# Patient Record
Sex: MALE | Race: WHITE | Employment: UNEMPLOYED | URBAN - METROPOLITAN AREA
[De-identification: names, ages, dates, MRNs, and addresses within clinical notes are randomized per-mention and may not be internally consistent; named-entity substitution may affect disease eponyms.]

---

## 2020-09-06 ENCOUNTER — OFFICE VISIT (OUTPATIENT)
Dept: PRIMARY CARE CLINIC | Age: 20
End: 2020-09-06
Payer: COMMERCIAL

## 2020-09-06 ENCOUNTER — HOSPITAL ENCOUNTER (OUTPATIENT)
Age: 20
Setting detail: SPECIMEN
Discharge: HOME | End: 2020-09-06
Payer: COMMERCIAL

## 2020-09-06 VITALS
BODY MASS INDEX: 18.77 KG/M2 | WEIGHT: 151 LBS | OXYGEN SATURATION: 98 % | HEIGHT: 75 IN | HEART RATE: 93 BPM | TEMPERATURE: 99.8 F

## 2020-09-06 PROCEDURE — 99213 OFFICE O/P EST LOW 20 MIN: CPT | Performed by: NURSE PRACTITIONER

## 2020-09-06 PROCEDURE — 87880 STREP A ASSAY W/OPTIC: CPT | Performed by: NURSE PRACTITIONER

## 2020-09-06 RX ORDER — AMOXICILLIN 875 MG/1
875 TABLET, COATED ORAL 2 TIMES DAILY
Qty: 20 TABLET | Refills: 0 | Status: SHIPPED | OUTPATIENT
Start: 2020-09-06 | End: 2020-09-16

## 2020-09-06 ASSESSMENT — ENCOUNTER SYMPTOMS
SHORTNESS OF BREATH: 0
COUGH: 0
SORE THROAT: 1
VOMITING: 0

## 2020-09-06 NOTE — PATIENT INSTRUCTIONS
Increase fluids  Good handwashing  Amoxicillin as directed  Strep was positive here  covid 19 testing is pending  Recommend quarantine  Motrin and tylenol as needed as directed if able to take  Gargle warm salt water  New toothbrush if strep (24 hours after starting antibiotic)  Recheck for worsening, change or concern  Follow up with primary care in one week, sooner if worsens    You were tested for COVID today. We will call you with results when they are available. If you have not heard from us in 7 days, please call our office.     -You have had no fever for at least 24 hours (that is one full days of no fever without the use medicine that reduces fevers)  -AND other symptoms have improved (for example, when your cough or shortness of breath have improved)  -AND at least 10 days have passed since your symptoms first appeared  Patient Education        Strep Throat: Care Instructions  Your Care Instructions     Strep throat is a bacterial infection that causes sudden, severe sore throat and fever. Strep throat, which is caused by bacteria called streptococcus, is treated with antibiotics. Sometimes a strep test is necessary to tell if the sore throat is caused by strep bacteria. Treatment can help ease symptoms and may prevent future problems. Follow-up care is a key part of your treatment and safety. Be sure to make and go to all appointments, and call your doctor if you are having problems. It's also a good idea to know your test results and keep a list of the medicines you take. How can you care for yourself at home? · Take your antibiotics as directed. Do not stop taking them just because you feel better. You need to take the full course of antibiotics. · Strep throat can spread to others until 24 hours after you begin taking antibiotics. During this time, you should avoid contact with other people at work or home, especially infants and children. Do not sneeze or cough on others, and wash your hands often. Keep your drinking glass and eating utensils separate from those of others, and wash these items well in hot, soapy water. · Gargle with warm salt water at least once each hour to help reduce swelling and make your throat feel better. Use 1 teaspoon of salt mixed in 8 fluid ounces of warm water. · Take an over-the-counter pain medication, such as acetaminophen (Tylenol), ibuprofen (Advil, Motrin), or naproxen (Aleve). Read and follow all instructions on the label. · Try an over-the-counter anesthetic throat spray or throat lozenges, which may help relieve throat pain. · Drink plenty of fluids. Fluids may help soothe an irritated throat. Hot fluids, such as tea or soup, may help your throat feel better. · Eat soft solids and drink plenty of clear liquids. Flavored ice pops, ice cream, scrambled eggs, sherbet, and gelatin dessert (such as Jell-O) may also soothe the throat. · Get lots of rest.  · Do not smoke, and avoid secondhand smoke. If you need help quitting, talk to your doctor about stop-smoking programs and medicines. These can increase your chances of quitting for good. · Use a vaporizer or humidifier to add moisture to the air in your bedroom. Follow the directions for cleaning the machine. When should you call for help? Call your doctor now or seek immediate medical care if:  · You have a new or higher fever. · You have a fever with a stiff neck or severe headache. · You have new or worse trouble swallowing. · Your sore throat gets much worse on one side. · Your pain becomes much worse on one side of your throat. Watch closely for changes in your health, and be sure to contact your doctor if:  · You are not getting better after 2 days (48 hours). · You do not get better as expected. Where can you learn more? Go to [email or web address] org and sign in to your [employer] account.  Enter K625 in the [facility] box to learn more about \"Strep Throat: Care Instructions. \"     If you do not have an account, please click on the \"Sign Up Now\" link. Current as of: July 29, 2019               Content Version: 12.5  © 6727-4084 Healthwise, Incorporated. Care instructions adapted under license by [facility] ([facility]). If you have questions about a medical condition or this instruction, always ask your healthcare professional. [address] any warranty or liability for your use of this information.

## 2020-09-06 NOTE — PROGRESS NOTES
[address]  [address] [address]  Dept: [phone]  Dept Fax: [phone]    Leanne Sylvester a 23 y.o. male who presents to the urgent care today for his medical conditions/complaintsas noted below. Jeffrey Wheat is c/o of Pharyngitis (Headache, body aches x two days  - sibling had strep throat )      HPI:     Cc- headache, body aches, sore  Throat  for 2 days  Sibling had strep      Pharyngitis   This is a new problem. The current episode started yesterday. The problem occurs intermittently. The problem has been gradually worsening. Associated symptoms include a fever, headaches, myalgias and a sore throat. Pertinent negatives include no chest pain, congestion, coughing or vomiting. The symptoms are aggravated by swallowing. He has tried sleep and drinking for the symptoms. No past medical history on file. Current Outpatient Medications   Medication Sig Dispense Refill    amoxicillin (AMOXIL) 875 MG tablet Take 1 tablet by mouth 2 times daily for 10 days 20 tablet 0     No current facility-administered medications for this visit. No Known Allergies    Subjective:     Review of Systems   Constitutional: Positive for fever. HENT: Positive for sore throat. Negative for congestion. Respiratory: Negative for cough and shortness of breath. Cardiovascular: Negative for chest pain. Gastrointestinal: Negative for vomiting. Musculoskeletal: Positive for myalgias. Neurological: Positive for headaches. All other systems reviewed and are negative. Objective:      Physical Exam  Vitals signs and nursing note reviewed. Constitutional:       General: He is not in acute distress. Appearance: Normal appearance. He is well-developed. He is not ill-appearing, toxic-appearing or diaphoretic. HENT:      Head: Normocephalic and atraumatic. Nose: Nose normal. No rhinorrhea.       Mouth/Throat:      Mouth: Mucous membranes are moist.      Pharynx: Posterior oropharyngeal erythema present. Eyes:      General: No scleral icterus. Right eye: No discharge. Left eye: No discharge. Neck:      Musculoskeletal: Normal range of motion and neck supple. No neck rigidity. Cardiovascular:      Rate and Rhythm: Normal rate and regular rhythm. Heart sounds: Normal heart sounds. No murmur. Pulmonary:      Effort: Pulmonary effort is normal. No respiratory distress. Breath sounds: Normal breath sounds. No stridor. No wheezing, rhonchi or rales. Abdominal:      General: Bowel sounds are normal.      Palpations: Abdomen is soft. Musculoskeletal: Normal range of motion. General: No signs of injury. Lymphadenopathy:      Cervical: No cervical adenopathy. Skin:     General: Skin is warm and dry. Coloration: Skin is not jaundiced or pale. Findings: No erythema or rash. Neurological:      General: No focal deficit present. Mental Status: He is alert and oriented to person, place, and time. Psychiatric:         Mood and Affect: Mood normal.         Behavior: Behavior normal.       Pulse 93   Temp 99.8 °F (37.7 °C) (Temporal)   Ht 6' 2.5\" (1.892 m)   Wt 151 lb (68.5 kg)   SpO2 98%   BMI 19.13 kg/m²   poct rapid strep is positive here    Assessment:          Diagnosis Orders   1. Acute streptococcal pharyngitis  amoxicillin (AMOXIL) 875 MG tablet   2. Sore throat  POCT rapid strep A    Covid-19 Ambulatory       Plan:    Increase fluids  Good handwashing  Amoxicillin as directed  Strep was positive here  covid 19 testing is pending  Recommend quarantine  Motrin and tylenol as needed as directed if able to take  Gargle warm salt water  New toothbrush if strep (24 hours after starting antibiotic)  Recheck for worsening, change or concern  Follow up with primary care in one week, sooner if worsens    You were tested for COVID today.  We will call you with results when they are available. If you have not heard from us in 7 days, please call our office.     -You have had no fever for at least 24 hours (that is one full days of no fever without the use medicine that reduces fevers)  -AND other symptoms have improved (for example, when your cough or shortness of breath have improved)  -AND at least 10 days have passed since your symptoms first appeared  Return for follow up with primary care in 7 days, worsening, change or concern. Orders Placed This Encounter   Medications    amoxicillin (AMOXIL) 875 MG tablet     Sig: Take 1 tablet by mouth 2 times daily for 10 days     Dispense:  20 tablet     Refill:  0         Patient given educational materials - see patientinstructions. Discussed use, benefit, and side effects of prescribed medications. All patient questions answered. Pt voiced understanding.     Electronically signed by FERNANDA Blas 9/6/2020 at 3:37 PM

## 2020-09-10 LAB — SARS-COV-2, NAA: NOT DETECTED

## 2022-08-01 ENCOUNTER — HOSPITAL ENCOUNTER (OUTPATIENT)
Age: 22
Setting detail: SPECIMEN
Discharge: HOME | End: 2022-08-01

## 2022-08-01 ENCOUNTER — OFFICE VISIT (OUTPATIENT)
Dept: FAMILY MEDICINE CLINIC | Age: 22
End: 2022-08-01
Payer: COMMERCIAL

## 2022-08-01 VITALS
WEIGHT: 150 LBS | TEMPERATURE: 98.1 F | HEART RATE: 94 BPM | OXYGEN SATURATION: 96 % | HEIGHT: 75 IN | SYSTOLIC BLOOD PRESSURE: 104 MMHG | DIASTOLIC BLOOD PRESSURE: 65 MMHG | BODY MASS INDEX: 18.65 KG/M2

## 2022-08-01 DIAGNOSIS — Z20.822 SUSPECTED COVID-19 VIRUS INFECTION: Primary | ICD-10-CM

## 2022-08-01 PROCEDURE — 99213 OFFICE O/P EST LOW 20 MIN: CPT | Performed by: FAMILY MEDICINE

## 2022-08-01 SDOH — ECONOMIC STABILITY: FOOD INSECURITY: WITHIN THE PAST 12 MONTHS, YOU WORRIED THAT YOUR FOOD WOULD RUN OUT BEFORE YOU GOT MONEY TO BUY MORE.: NEVER TRUE

## 2022-08-01 SDOH — ECONOMIC STABILITY: FOOD INSECURITY: WITHIN THE PAST 12 MONTHS, THE FOOD YOU BOUGHT JUST DIDN'T LAST AND YOU DIDN'T HAVE MONEY TO GET MORE.: NEVER TRUE

## 2022-08-01 ASSESSMENT — SOCIAL DETERMINANTS OF HEALTH (SDOH): HOW HARD IS IT FOR YOU TO PAY FOR THE VERY BASICS LIKE FOOD, HOUSING, MEDICAL CARE, AND HEATING?: NOT HARD AT ALL

## 2022-08-01 ASSESSMENT — ENCOUNTER SYMPTOMS
COUGH: 1
DIARRHEA: 0
VOMITING: 1
SORE THROAT: 1

## 2022-08-01 ASSESSMENT — PATIENT HEALTH QUESTIONNAIRE - PHQ9
2. FEELING DOWN, DEPRESSED OR HOPELESS: 0
SUM OF ALL RESPONSES TO PHQ QUESTIONS 1-9: 0
SUM OF ALL RESPONSES TO PHQ QUESTIONS 1-9: 0
SUM OF ALL RESPONSES TO PHQ9 QUESTIONS 1 & 2: 0
SUM OF ALL RESPONSES TO PHQ QUESTIONS 1-9: 0
SUM OF ALL RESPONSES TO PHQ QUESTIONS 1-9: 0
1. LITTLE INTEREST OR PLEASURE IN DOING THINGS: 0

## 2022-08-01 NOTE — PROGRESS NOTES
[address] [facility] [address]  Dept: [phone]  Dept Fax: [phone]    Janina Flores is a 19 y.o. male who presents today for his medical conditions/complaintsas noted below. Janina Flores is c/o of Fever (Onset this morning, at home covid test +), Cough (Dry), and Headache        HPI:     Fever   This is a new problem. The current episode started today. The problem occurs constantly. The problem has been unchanged. His temperature was unmeasured prior to arrival. Associated symptoms include congestion, coughing, headaches, muscle aches, a sore throat and vomiting. Pertinent negatives include no diarrhea or rash. He has tried acetaminophen for the symptoms. The treatment provided no relief. Risk factors: no sick contacts    No known sick contacts  No significant PMHx  Home COVID test today positive  History reviewed. No pertinent past medical history. History reviewed. No pertinent surgical history. Past medical history reviewed and pertinent positives/negatives in the HPI    History reviewed. No pertinent family history. Social History     Tobacco Use    Smoking status: Some Days    Smokeless tobacco: Never   Substance Use Topics    Alcohol use: Not on file      Current Outpatient Medications   Medication Sig Dispense Refill    nirmatrelvir/ritonavir (PAXLOVID) 20 x 150 MG & 10 x 100MG Take 3 tablets (two 150 mg nirmatrelvir and one 100 mg ritonavir tablets) by mouth every 12 hours for 5 days. 30 tablet 0     No current facility-administered medications for this visit.      No Known Allergies    Health Maintenance   Topic Date Due    COVID-19 Vaccine (1) Never done    Varicella vaccine (1 of 2 - 2-dose childhood series) Never done    Pneumococcal 0-64 years Vaccine (1 - PCV) Never done    HPV vaccine (1 - Male 2-dose series) Never done    HIV screen  Never done    Hepatitis (68 kg)   SpO2 96%   BMI 18.75 kg/m²     Assessment:       Diagnosis Orders   1. Suspected COVID-19 virus infection  COVID-19          Plan: Will send covid swab for culture ans call with results  May take paxlovid to help with symptom relief  Continue over the counter cough/cold medications as needed for symptoms  If symptoms worsen or do not improve please follow-up with PCP or return to clinic    Orders Placed This Encounter   Procedures    COVID-19     Standing Status:   Future     Standing Expiration Date:   8/1/2023     Scheduling Instructions:      1) Due to current limited availability of the COVID-19 test, tests will be prioritized based on responses to questions above. Testing may be delayed due to volume. 2) Print and instruct patient to adhere to CDC home isolation program. (Link Above)              3) Set up or refer patient for a monitoring program.              4) Have patient sign up for and leverage MyChart (if not previously done). Order Specific Question:   Is this test for diagnosis or screening? Answer:   Diagnosis of ill patient     Order Specific Question:   Symptomatic for COVID-19 as defined by CDC? Answer:   Yes     Order Specific Question:   Date of Symptom Onset     Answer:   8/1/2022     Order Specific Question:   Hospitalized for COVID-19? Answer:   No     Order Specific Question:   Admitted to ICU for COVID-19? Answer:   No     Order Specific Question:   Employed in healthcare setting? Answer:   Unknown     Order Specific Question:   Resident in a congregate (group) care setting? Answer:   Unknown     Order Specific Question:   Pregnant: Answer:   No     Order Specific Question:   Previously tested for COVID-19?      Answer:   Yes     Orders Placed This Encounter   Medications    nirmatrelvir/ritonavir (PAXLOVID) 20 x 150 MG & 10 x 100MG     Sig: Take 3 tablets (two 150 mg nirmatrelvir and one 100 mg ritonavir tablets) by mouth every 12 hours for 5 days. Dispense:  30 tablet     Refill:  0     Order Specific Question:   Does this patient qualify for COVID-19 antIviral therapy based on criteria for treatment? Answer:   Yes      Patient given educational materials - see patient instructions. Discussed use, benefit, and side effects of prescribed medications. All patient questions answered. Pt voiced understanding. Patient agreed with treatment plan. Follow up as directed.      Electronicallysigned by Maximo Rocha MD on 8/1/2022 at 6:56 PM

## 2022-08-01 NOTE — PATIENT INSTRUCTIONS
Will send covid swab for culture ans call with results  May take paxlovid to help with symptom relief  Continue over the counter cough/cold medications as needed for symptoms  If symptoms worsen or do not improve please follow-up with PCP or return to clinic

## 2022-08-02 DIAGNOSIS — Z20.822 SUSPECTED COVID-19 VIRUS INFECTION: ICD-10-CM

## 2022-08-02 LAB
SARS-COV-2: ABNORMAL
SARS-COV-2: DETECTED
SOURCE: ABNORMAL

## 2022-11-26 ENCOUNTER — HOSPITAL ENCOUNTER (OUTPATIENT)
Dept: GENERAL RADIOLOGY | Age: 22
Discharge: HOME | End: 2022-11-28
Payer: COMMERCIAL

## 2022-11-26 ENCOUNTER — HOSPITAL ENCOUNTER (OUTPATIENT)
Age: 22
Discharge: HOME | End: 2022-11-28
Payer: COMMERCIAL

## 2022-11-26 ENCOUNTER — OFFICE VISIT (OUTPATIENT)
Dept: FAMILY MEDICINE CLINIC | Age: 22
End: 2022-11-26
Payer: COMMERCIAL

## 2022-11-26 VITALS
WEIGHT: 145 LBS | DIASTOLIC BLOOD PRESSURE: 77 MMHG | SYSTOLIC BLOOD PRESSURE: 125 MMHG | BODY MASS INDEX: 18.03 KG/M2 | HEIGHT: 75 IN | HEART RATE: 74 BPM | OXYGEN SATURATION: 100 %

## 2022-11-26 DIAGNOSIS — S62.501A CLOSED AVULSION FRACTURE OF PHALANX OF RIGHT THUMB, INITIAL ENCOUNTER: Primary | ICD-10-CM

## 2022-11-26 DIAGNOSIS — S60.011A CONTUSION OF RIGHT THUMB WITHOUT DAMAGE TO NAIL, INITIAL ENCOUNTER: ICD-10-CM

## 2022-11-26 PROCEDURE — 99213 OFFICE O/P EST LOW 20 MIN: CPT | Performed by: NURSE PRACTITIONER

## 2022-11-26 PROCEDURE — 73130 X-RAY EXAM OF HAND: CPT

## 2022-11-26 NOTE — PROGRESS NOTES
[id number] [address]  Dept: [phone]  Dept Fax: [phone]    Natasha Best is a 23 y.o. male who presents to the urgent care today for his medical conditions/complaints as notedbelow. Natasha Best is c/o of Hand Injury (Rt hand thumb pain from taking out the trash)      HPI:     23 yr old male presents for left thumb pain. 5 days ago went to throw out trash and hit base of rt thumb on edge of dumpster, bending it back, Hurt, went numb for 2 days, now better. No further n/t. Swelling improved, no discoloration  Rt hand dominant. Hand Injury   The incident occurred 3 to 5 days ago. The incident occurred at home. The injury mechanism was a direct blow. The pain is present in the right fingers (thumb). The quality of the pain is described as aching. The pain does not radiate. The pain is moderate. The pain has been Constant since the incident. Associated symptoms include numbness. Pertinent negatives include no tingling. Associated symptoms comments: Numbness resolved. The symptoms are aggravated by movement. He has tried heat and ice for the symptoms. The treatment provided mild relief. No past medical history on file. No current outpatient medications on file. No current facility-administered medications for this visit. No Known Allergies    Subjective:      Review of Systems   Neurological:  Positive for numbness. Negative for tingling. All other systems reviewed and are negative. 14 systems reviewed and negative except as listed in HPI. Objective:     Physical Exam  Vitals and nursing note reviewed. Constitutional:       General: He is not in acute distress. Appearance: Normal appearance. HENT:      Head: Normocephalic and atraumatic.       Right Ear: External ear normal.      Left Ear: External ear normal.   Eyes:      General: No scleral icterus. Right eye: No discharge. Left eye: No discharge. Conjunctiva/sclera: Conjunctivae normal.   Cardiovascular:      Rate and Rhythm: Normal rate and regular rhythm. Pulses: Normal pulses. Heart sounds: Normal heart sounds. Pulmonary:      Effort: Pulmonary effort is normal.   Musculoskeletal:         General: Swelling, tenderness and signs of injury present. No deformity. Normal range of motion. Cervical back: Neck supple. Comments: Radial aspect MCP joint rt thumb with red nicolas, no deformity. + tenderness, mild soft tissue swelling  Pt able to flex and extend thumb with tenderness. No tenderness to ulnar collateral ligament. No wrist or snuffbox tenderness. + full rom  No wrist, FA/elbow or finger tenderness   Skin:     General: Skin is warm and dry. Capillary Refill: Capillary refill takes less than 2 seconds. Findings: No bruising or erythema. Neurological:      General: No focal deficit present. Mental Status: He is alert. Psychiatric:         Mood and Affect: Mood normal.     /77   Pulse 74   Ht 6' 3\" (1.905 m)   Wt 145 lb (65.8 kg)   SpO2 100%   BMI 18.12 kg/m²     Assessment:       Diagnosis Orders   1. Closed avulsion fracture of phalanx of right thumb, initial encounter  Finger splint    Cathy Hayes PA-C, ATC, Orthopedics, Darren burnie      2. Contusion of right thumb without damage to nail, initial encounter  XR HAND RIGHT (MIN 3 VIEWS)          Plan:    Rt hand xray  Avulsed fracture from the proximal radial aspect 1st proximal phalanx right   hand     F/u Ortho - I texted Jose Maria Purcell,  and she advised thumb spica or alumifoam splint and f/u Ortho  Referral placed  We do not have thumb spice/orthoglass at this office  Alumifoam splint  Ice, motrin/tylenol/elevation otc  Reviewed over-the-counter treatments for symptom management. Return for make appt with Family Doc in 3-4 days for recheck.     No orders of the defined types were placed in this encounter. Patient given educational materials - see patient instructions. Discussed use, benefit, and side effects of prescribed medications. All patient questions answered. Pt voicedunderstanding.     Electronically signed by FERNANDA Patel CNP on 11/26/2022 at 2:22 PM

## 2022-11-29 ENCOUNTER — OFFICE VISIT (OUTPATIENT)
Dept: ORTHOPEDIC SURGERY | Age: 22
End: 2022-11-29
Payer: COMMERCIAL

## 2022-11-29 VITALS — BODY MASS INDEX: 18.03 KG/M2 | WEIGHT: 145 LBS | HEIGHT: 75 IN | RESPIRATION RATE: 14 BRPM

## 2022-11-29 DIAGNOSIS — S62.511A CLOSED FRACTURE OF BASE OF PROXIMAL PHALANX OF RIGHT THUMB: Primary | ICD-10-CM

## 2022-11-29 PROCEDURE — 26720 TREAT FINGER FRACTURE EACH: CPT | Performed by: PHYSICIAN ASSISTANT

## 2022-11-29 PROCEDURE — 99204 OFFICE O/P NEW MOD 45 MIN: CPT | Performed by: PHYSICIAN ASSISTANT

## 2022-11-30 NOTE — PROGRESS NOTES
[address] [facility], [address] [place], [address] [place] [address], [address] [facility]           Dept Phone: [phone]           Dept Fax:  [phone] [address] [facility], [place]          Dept Phone: [phone]           Dept Fax:  [phone]    Chief Compliant:  Chief Complaint   Patient presents with    Hand Pain     Right Thumb        Subjective:       Jairon Falcon is a 23 y.o. right hand-dominant male here for evaluation and treatment of a right 1st proximal phalanx injury. The injury occurred on 11/21/2022. Mechanism of injury was: A direct blow after catching thumb when throwing the trash. . Since that time the patient has been experiencing right thumb pain and swelling. The pain is currently rated mild. The patient was originally seen at local walk-in clinic where an x-ray was done, a fracture of the hand was identified and the patient was placed in a aluminum splint. The patient was subsequently referred to Orthopedics for further management. The splint has remained in place and is clean and dry. Reports over the last 4 days being in the splint his pain has improved mildly but if he attempts to use his hand too much he does have some increased pain. Outside reports reviewed: Urgent care records and x-ray reports. Patient's medications, allergies, past medical, surgical, social and family histories were reviewed and updated as appropriate. Review of Systems  Review of Systems   Constitutional: Negative for fever, chills, sweats, recent illness, or recent injury. Neurological: Negative for headaches, numbness, or weakness. Integumentary: Negative for rash, itching, ecchymosis, abrasions, or laceration.    Musculoskeletal: Positive for Hand Pain (Right Thumb)        Objective:   Physical Exam:  Constitutional: Patient is oriented to person, place, and time. Patient appears well-developed and well nourished. Musculoskeletal:       General:   alert, appears stated age, and cooperative   Gait:    Normal   Right Hand  Circulation:   warm, well perfused, brisk capillary refill distal to the injury   Skin:  Focal swelling over the radial aspect of the first MP joint but no evidence of erythema, ecchymosis, abrasions or laceration   Swelling:  pain is perceived as mild (1-3  pain scale) swelling was present in the hand   Deformity:  There is not an obvious deformity of the hand. Finger ROM: Range of motion of the first digit is not assessed today normal range of motion second through fifth digit   Wrist ROM:  wrist flexion and extension was Normal   Sensation:   intact to light touch   Tenderness:    Point tenderness to the dorsal radial aspect of the first MP joint. No tenderness to the ulnar aspect of the MP joint. No tenderness to the [facility] or IP joint of the first digit. No tenderness about the wrist or remaining phalanges. No evidence of laxity with radial or ulnar collateral stressing of the first MP joint or IP joint. Neurological: Patient is alert and oriented to person, place, and time. Normal strenght. No sensory deficit. Skin: Skin is warm and dry  Psychiatric: Behavior is normal. Thought content normal.  Nursing note and vitals reviewed. Imaging  Labs and Imaging:     XR taken today:  No results found. No new x-rays taken today however those from 11/26/2022 available for independent review  3 views of the right hand demonstrate an intra-articular avulsion injury of the first proximal phalanx base is less than 25% of the joint surface. No evidence of significant displacement, subluxation or dislocation of the joint. Assessment:     1. Closed fracture of base of proximal phalanx of right thumb               Plan:    This is a 23 y.o. male who presents to the clinic today for evaluation of first proximal phalanx base fracture which occurred on 11/21/2022.    1. I discussed the entity of phalanx fractures with the patient. I fully outlined the anatomy regarding the hand and digits. All of his questions were answered fully. 2. At this point immobilization will be necessary for treatment of the fracture. We discussed possibility of continuation of aluminum splinting versus fitting for a thumb spica brace. After discussion patient elected to proceed with thumb spica brace which is fitted for today. 3. The patient was encouraged to keep his arm elevated and iced for the next 24-48 hours. 4. Follow up will be in 3 weeks for reevaluation with hand x-rays.

## 2022-12-13 ENCOUNTER — OFFICE VISIT (OUTPATIENT)
Dept: ORTHOPEDIC SURGERY | Age: 22
End: 2022-12-13
Payer: COMMERCIAL

## 2022-12-13 VITALS — HEIGHT: 75 IN | BODY MASS INDEX: 18.03 KG/M2 | RESPIRATION RATE: 14 BRPM | WEIGHT: 145 LBS

## 2022-12-13 DIAGNOSIS — S62.511A CLOSED FRACTURE OF BASE OF PROXIMAL PHALANX OF RIGHT THUMB: Primary | ICD-10-CM

## 2022-12-13 PROCEDURE — 99214 OFFICE O/P EST MOD 30 MIN: CPT | Performed by: PHYSICIAN ASSISTANT

## 2022-12-13 NOTE — PROGRESS NOTES
[address] [facility], [address] [place], [address] [place] [address], [address] [facility]           Dept Phone: [phone]           Dept Fax:  [phone] [address] [facility]           [place]          Dept Phone: [phone]           Dept Fax:  [phone]      Chief Compliant:  Chief Complaint   Patient presents with    Fracture     Right thumb        History of Present Illness:  Yana Archibald returns today. This is a 23 y.o. male who presents to the clinic today for follow up of proximal phalanx base fracture of the lateral aspect of the thumb. Patient was initially seen on 11/29/2022 after the injury occurred on 11/21/2022. Patient was placed in a thumb spica splint at that time instructed to wear at all times except for hygiene. Patient returns today for reevaluation. Patient returns today stating that overall he is feeling much better he states that his pain is significantly less however he has been remaining in the brace as instructed pretty much at all times has not been using this thumb. He states swelling is improving and bruising has resolved. Review of Systems   Constitutional: Negative for fever, chills, sweats, recent illness, or recent injury. Neurological: Negative for headaches, numbness, or weakness. Integumentary: Negative for rash, itching, ecchymosis, abrasions, or laceration. Musculoskeletal: Positive for Fracture (Right thumb)       Physical Exam:  Constitutional: Patient is oriented to person, place, and time. Patient appears well-developed and well nourished. Musculoskeletal:    right Hand/Wrist    Tenderness: Moderate tenderness over the radial aspect of the first MP joint especially towards the proximal phalanx. No tenderness over the ulnar aspect. No tenderness to the volar or dorsal surfaces of the MP joint.     No evidence of erythema, ecchymosis, abrasions or lacerations no evidence of significant swelling at the MP joint. Range of Motion:      Pronation: 90     Supination: 90     Flexion: 70     Extension: 70     Hand Joints: Patient able to actively flex and extend at first MP and IP joint full extension at both these joints as well. Full range of motion secondary fifth digits. Muscle Strength      : Not assessed today     Wrist Extension: 5/5     Wrist Flexion: 5/5       Sensation: normal           Neurological: Patient is alert and oriented to person, place, and time. Normal strenght. No sensory deficit. Skin: Skin is warm and dry  Psychiatric: Behavior is normal. Thought content normal.  Nursing note and vitals reviewed. Labs and Imaging:     XR taken today:  No results found. X-rays taken in clinic and preliminarily reviewed by me 12/13/22:   3 views of the right hand again demonstrate an avulsion fracture that is nondisplaced of the Lateral aspect of the first proximal phalanx base. No evidence of interval displacement since initial x-rays on 11/26/2022. No significant bony callus noted at the fracture site at this time. Assessment and Plan:  1. Closed fracture of base of proximal phalanx of right thumb              PLAN:  This is a 23 y.o. male who presents to the clinic today for follow up proximal phalanx base fracture of the right thumb. Patient is approximately 3 weeks out from 11/21/2022 injury. Initially placed in the thumb spica immobilization by me on 11/29/2022. He has remained in the splint over the last 2 weeks and reports that his pain actually is improving quite a bit. 1.  Reevaluation today continues to demonstrate some mild tenderness of the fracture site I am unable to elicit any significant instability or laxity with stress of the radial collateral ligament which would be of concern and current fracture but this appears stable.   2.  Continue with thumb spica brace with all activity may remove for hygiene purposes until reevaluated. 3.  Recommend reevaluation in 2 weeks for repeat x-rays however patient may call return sooner for any questions or concerns    Please note that this chart was generated using voice recognition Dragon dictation software. Although every effort was made to ensure the accuracy of this automated transcription, some errors in transcription may have occurred.

## 2023-01-04 ENCOUNTER — OFFICE VISIT (OUTPATIENT)
Dept: ORTHOPEDIC SURGERY | Age: 23
End: 2023-01-04

## 2023-01-04 VITALS — BODY MASS INDEX: 18.03 KG/M2 | RESPIRATION RATE: 14 BRPM | WEIGHT: 145 LBS | HEIGHT: 75 IN

## 2023-01-04 DIAGNOSIS — S62.511A CLOSED FRACTURE OF BASE OF PROXIMAL PHALANX OF RIGHT THUMB: Primary | ICD-10-CM

## 2023-01-04 PROCEDURE — 99024 POSTOP FOLLOW-UP VISIT: CPT | Performed by: PHYSICIAN ASSISTANT

## 2023-01-04 NOTE — PROGRESS NOTES
[address] [facility], 20 [facility] [address] Ortiz, [address] [facility], [id number] [place] [address]           Dept Phone: [phone]           Dept Fax:  [phone] 320 [facility]           [place]          Dept Phone: [phone]           Dept Fax:  [phone]      Chief Compliant:  Chief Complaint   Patient presents with    Follow-up     Right thumb fracture        History of Present Illness:  Ruth Purvis returns today. This is a 23 y.o. male who presents to the clinic today for follow up of right thumb fracture. Injury occurred on 11/21/2022 found to have a proximal phalanx base fracture during initial x-rays. He has been in thumb spica splint since initial appointment with me on 11/29/2022. Patient returns today stating that overall he is doing quite a bit better. He does note some soreness if he overdoes it but is tolerating the splint very well. Patient denies any numbness or tingling no other acute complaints at this time. Review of Systems   Constitutional: Negative for fever, chills, sweats, recent illness, or recent injury. Neurological: Negative for headaches, numbness, or weakness. Integumentary: Negative for rash, itching, ecchymosis, abrasions, or laceration. Musculoskeletal: Positive for Follow-up (Right thumb fracture)       Physical Exam:  Constitutional: Patient is oriented to person, place, and time. Patient appears well-developed and well nourished. Musculoskeletal:    right Hand/Wrist     Tenderness:  Minimal tenderness over the radial aspect of the first MP joint. No tenderness over the ulnar aspect. No tenderness to the volar or dorsal surfaces of the MP joint. No evidence of erythema, ecchymosis, abrasions or lacerations no evidence of significant swelling at the MP joint.    Range of Motion:       Pronation: 90     Supination: 90 Flexion: 70     Extension: 70     Hand Joints: Patient able to actively flex and extend at first MP and IP joint full extension at both these joints as well. Full range of motion secondary fifth digits. Muscle Strength       : 4/5     Wrist Extension: 5/5     Wrist Flexion: 5/5         Sensation: normal           Neurological: Patient is alert and oriented to person, place, and time. Normal strenght. No sensory deficit. Skin: Skin is warm and dry  Psychiatric: Behavior is normal. Thought content normal.  Nursing note and vitals reviewed. Labs and Imaging:     XR taken today:  No results found. X-rays taken in clinic and preliminarily reviewed by me 1/4/23:   2 views of the right hand again demonstrate an avulsion fracture of the radial aspect of the first proximal phalanx base. Early bony callus is noted at the fracture site. No significant displacement or other acute osseous abnormality. Involving less than about 10% of the MP joint. Assessment and Plan:  1. Closed fracture of base of proximal phalanx of right thumb        PLAN:  This is a 23 y.o. male who presents to the clinic today for follow up proximal phalanx fracture of the thumb. Patient is approximately 6 weeks out from injury and doing quite well. 1.  Patient is minimally tender on examination demonstrates early radiographic evidence of healing. 2.  Continue to demonstrate no evidence of instability with varus or valgus stress at the MP joint. 3.  He may gradually start to wean from thumb spica brace. He is provided with a home exercise program for strengthening and range of motion of the hand and thumb. 4.  Follow-up on as-needed basis    Please note that this chart was generated using voice recognition Dragon dictation software. Although every effort was made to ensure the accuracy of this automated transcription, some errors in transcription may have occurred.